# Patient Record
(demographics unavailable — no encounter records)

---

## 2018-11-15 NOTE — ULT
OB ULTRASOUND:

11/15/18

 

HISTORY: 

Premature rupture of membranes. 

 

Multiple longitudinal and transverse images of an intrauterine pregnancy is obtained using a multiher
tz curvilinear transducer. Real time, color flow and spectral waveform doppler analysis demonstrates 
a viable intrauterine pregnancy with a fetus in breech presentation. The placenta is posterior . feta
l heart rate measures 157 beats per minute. There is marked oligohydramnios with amniotic fluid index
 measuring 1.3 cm. 

 

BIOMETRICS:

Biparietal diameter      52 mm            22 weeks, 0 days

Head circumference      209 mm      23 week, 0 days

Abdominal circumference      190 mm      23 weeks, 5 days

Femur length             45 mm            23 weeks, 5 days

 

This fetus has an overall estimated gestational age of 23 weeks, 1 day with an estimated date of deli
very of 3/13/19.

 

Estimated fetal weight is 648 grams plus or minus 96 grams. 

 

IMPRESSION:  

Oligohydramnios with estimated  gestational age of 23 weeks, 1 day and an estimated fetal weight of 6
48 grams plus/minus 96 grams. 

 

POS: [facility]

## 2018-11-16 NOTE — PDOC.PP
Post Partum Progress Note


Post Partum Day #: 1


Subjective: 





called to see patient for hypotension. BP 80/50 but asymptomatic. Patient was 

seen at 13:55 until 14:05 at bedside. No acute distress, looks comfortable, 

mike now out. CBC and CMP both pending. 


PO intake tolerated: yes


Flatus: no


Ambulation: no


 Vital Signs (12 hours)











  Temp Pulse Resp BP Pulse Ox


 


 18 13:35  98.7 F  80  20  90/52 L  95


 


 18 13:01  97.8 F  76  20  100/53 L  95


 


 18 11:30  97.8 F  76  20  89/53 L  97


 


 18 08:00  98.1 F  74  20  100/55 L  97


 


 18 04:30  98.0 F  80  16  100/54 L 


 


 18 02:50  98 F  87  16  106/55 L  96








 Weight











Weight                         220 lb

















- Physical Examination


General: NAD


Deviation from normal:  dressing still in place (silver dressing)


Neurological: no gross focal deficits


Psychiatric: A&Ox3, normal affect


Result Diagrams: 


 18 05:52





Additional Labs: 


 Post Partum Labs











Blood Type  A POSITIVE   11/15/18  22:04    











(1) Chorioamnionitis in second trimester


Code(s): O41.1220 - CHORIOAMNIONITIS, SECOND TRIMESTER, NOT APPLICABLE OR UNSP 

  Status: Acute   





(2) Delivery by emergency  section


Code(s): O82 - ENCOUNTER FOR  DELIVERY WITHOUT INDICATION   Status: 

Acute   





(3) No prenatal care in current pregnancy in second trimester


Code(s): O09.32 - SUPRVSN OF PREG W INSUFFICIENT ANTENAT CARE, SECOND TRI   

Status: Acute   





- Assessment/Plan





Plan: 1) continue triple antibiotics and IV fluids 


2) Check labs as ordered 


3) no clinical evidence of sepsis 


4) Patient has requested handwritten note from me stating that she is admitted 

for her [place]. Note given without 

disclosing protected health information.

## 2018-11-16 NOTE — OP
DATE OF PROCEDURE:  11/15/2018

 

PREOPERATIVE DIAGNOSES:

1.  Intrauterine pregnancy, estimated at 23 weeks by 23-week ultrasound.

2.  No prenatal care.

3.  Premature rupture of membranes.

4.  Chorioamnionitis.

5.  Breech presentation with protrusion of the legs from the cervix.

 

POSTOPERATIVE DIAGNOSES:

1.  Intrauterine pregnancy, estimated at 23 weeks by 23-week ultrasound.

2.  No prenatal care.

3.  Premature rupture of membranes.

4.  Chorioamnionitis.

5.  Breech presentation with protrusion of the legs from the cervix.

 

PROCEDURE:  Classical  section.

 

SURGEON:  Braxton De La Rosa M.D.

 

FIRST ASSISTANT:  Dr. Delta Ojeda, Resident.

 

ANESTHESIA:  General.

 

COUNTS:  Correct.

 

CONDITION:  Stable to recovery room.

 

COMPLICATIONS:  None.

 

SPECIMENS:  Placenta to pathology.

 

FINDINGS:  Female infant delivered on 11/15/2018 at 2220 hours by classical  section.  The pa
tient in footling breech presentation.  Apgars and weight unavailable at time of dictation.

 

ESTIMATED BLOOD LOSS:  Calculated blood loss 950 mL.

 

INDICATIONS FOR PROCEDURE:  Ms. Edna Thomason is a 42-year-old female who presented to the emergenc
y room after being newly diagnosed today with pregnancy when she presented to a physician for leaking
 fluid.  Upon arrival, patient was brought to Labor and Delivery for evaluation.  Due to the patient'
s habitus, we were unable to find the fetal heart tones and ultrasound was ordered for gestational ag
e as the patient was unable to give a firm last menstrual period date.  Upon evaluation, the patient 
was noted to have a fetus in footling breech presentation with no fluid, estimated to be about 690 gr
ams and 23 weeks gestation.  There was some concern on ultrasound that the feet may be protruding thr
ough the cervix as the cervix could not be identified.  On physical exam, the patient was noted to ha
ve a bulging bag and legs and feet and purulent fluid leaking.  At this point, the patient was taken 
to the operating room for  after being counseled extensively by the neonatologist due to the
 periviable state of her pregnancy.  The mother repeated almost on 2 or 3 occasions that she wanted e
verything done for this infant.

 

DESCRIPTION OF PROCEDURE:  Once the patient was taken back for , she was placed under general
 anesthesia without difficulty.  After being placed in dorsal supine position with a leftward tilt an
d being prepped and draped in normal sterile fashion.  A Pfannenstiel skin incision was then made and
 carried down to the level of the fascia.  The fascia was incised and the fascial incision was extend
ed laterally with Fernando scissors.  At midline, the rectus muscles, the peritoneal cavity was then ente
red into bluntly and extended bluntly.  An Dillon O retractor was then inserted for better visualizat
ion.  The uterus was noted to be midline and did not appear to have any adhesive disease.  A classica
l skin incision was then made into the uterine cavity and extended superiorly and inferiorly with ban
dage scissors.  Fetus was noted to be very deep in the pelvis and the incision was extended further s
uperiorly and inferiorly until the fetus could be delivered with care.  The infant was delivered to a
 sterile field.  The cord was clamped and cut and the infant was handed to the waiting attendants for
 resuscitation.  Immediately upon entry into the uterine cavity, it was noted that the patient had a 
purulent infectious odor.  The placenta and amnion were removed and the uterine incision was closed i
n 3 layers with #1 Monocryl in a baseball stitch.  A couple additional [id number] were of 2-0 c
hromic on SH were used for hemostasis.  The uterus was then covered with Seprafilm.  The tubes and ov
juancho were evaluated and appeared to be normal and the Dillon O retractor was removed.  The peritonea
l cavity was then closed with 2-0 chromic.  The fascia was closed with 0 Vicryl in a running fashion 
and then the skin was closed with staples given the patient's risk for infection.  At this point, the
 procedure was completed and the patient was extubated and taken to recovery in stable condition.

## 2018-11-16 NOTE — PDOC.EVN
Event Note





- Event Note


Event Note: 





[place] [address]: CMP is normal and lactate normal

## 2018-11-16 NOTE — PDOC.EVN
Event Note





- Event Note


Event Note: 


On Call note: POD 1 s/p classical CS at 22-23 weeks:








@ 1120: I was just notified by Tana, our patient's nurse on PP, that the 

patient had a BP of 80/60 or so. Afebrile and nontachycardic. 





On triple ABX and on fentynl pump. RR not labored. 





I have ordered a 500ml NS bolus for now with recheck of BP in 30 minutes.

## 2018-11-17 NOTE — PDOC.PP
Post Partum Progress Note


Post Partum Day #: 2


Subjective: 





Doing well, no new complaints


PO intake tolerated: yes


Flatus: yes


Ambulation: yes


 Vital Signs (12 hours)











  Temp Pulse Resp BP Pulse Ox


 


 18 00:25  97.8 F  82  18  103/54 L 


 


 18 20:27  98.4 F  79  18  91/54 L  96








 Weight











Weight                         220 lb

















- Physical Examination


General: NAD


Cardiovascular: no m/r/g


Respiratory: clear to auscultation bilaterally


Abdominal: + bowel sounds, lochia, no distention, appropriately TTP


Extremities: negative homans (B)


Neurological: no gross focal deficits


Psychiatric: A&Ox3, normal affect


Result Diagrams: 


 18 05:52





 18 13:44


Additional Labs: 


 Post Partum Labs











Blood Type  A POSITIVE   11/15/18  22:04    











(1) Chorioamnionitis in second trimester


Code(s): O41.1220 - CHORIOAMNIONITIS, SECOND TRIMESTER, NOT APPLICABLE OR UNSP 

  Status: Acute   





(2) Delivery by emergency  section


Code(s): O82 - ENCOUNTER FOR  DELIVERY WITHOUT INDICATION   Status: 

Acute   





(3) No prenatal care in current pregnancy in second trimester


Code(s): O09.32 - SUPRVSN OF PREG W INSUFFICIENT ANTENAT CARE, SECOND TRI   

Status: Acute   





- Assessment/Plan





PODb 2 s/p 23 week classical CS with Dx of IAI on triple ABX:


1. CBC ok


2. Afebrile


3. baby in [facility]


4. Clinically improved....we keep her on ABX as she was grossly infected, but 

we will stop IV antibiotics today and follow. Stop ABX at noon today

## 2018-11-19 NOTE — DIS
DATE OF ADMISSION:  11/15/2018

 

DATE OF DISCHARGE:  2018

 

ADMITTING DIAGNOSES:

1.  Intrauterine pregnancy at 23 weeks.

2.  Footling breech presentation with advanced cervical dilation.

3.  Premature  rupture of membranes.

4.  Chorioamnionitis.

 

DISCHARGE DIAGNOSES:

1.  Intrauterine pregnancy at 23 weeks.

2.  Footling breech presentation with advanced cervical dilation.

3.  Premature  rupture of membranes.

4.  Chorioamnionitis.

 

PROCEDURE:  Repeat  classical.

 

CONSULTATIONS:  None.

 

HOSPITAL COURSE:  The patient is a 42-year-old female, who presented to Labor and Delivery after disc
overing that she was pregnant and leaking fluid and was told that she was pregnant by an outpatient c
linic.  The patient presented and was during the evaluation noted to have the fetus in breech present
ation with feet and legs protruding through the cervix with an estimated gestational age of 23 weeks 
by ultrasound that day.  After patient was counseled by Neonatology, decision was made to proceed wit
h  and resuscitation of the fetus.  A classical  was performed.  For operative deta
ils, please refer to the operative note.  Following the procedure, the patient was taken to recovery 
room and placed on triple antibiotics for 48 hours.  Today is hospital day #4, postop day #3, the pat
ient is doing well, has been afebrile throughout her stay and reports that she is tolerating p.o., vo
iding on her own, having decreased lochia, and good pain control.

 

PHYSICAL EXAMINATION:

VITAL SIGNS:  This morning, blood pressure 99/57, pulse of 78, respiratory rate of 20.

GENERAL:  She appears to be in no acute distress.  She is alert and oriented, cooperative and pleasan
t to interact with.

HEAD:  Normocephalic, atraumatic.

ABDOMEN:  Fundus is firm.  Incision is clean, dry, and intact with staples and a silver dressing.

 

Her post-delivery hemoglobin is 12.8, hematocrit 38.3, platelets 272,000.

 

The patient is being discharged to home with a staple remover kit and Steri-Strips with instructions 
to follow up with [facility] for staple removal in the next 2-3 days.  The patient also has instruct
ions to follow up in 2 weeks for incision check and in 6 weeks for routine postpartum visit.  She marco
l be discharged with ibuprofen 800 mg #30 to be taken 3 times a day as needed for pain and tramadol 5
0 mg 1-2 tablets q.4 hours p.r.n. for pain, #20.  Her baby was transferred to [facility] [place].  She has been counseled on her travels, not to exceed 2 hours at a time without interrupt
ions for ambulation.